# Patient Record
(demographics unavailable — no encounter records)

---

## 2024-12-11 NOTE — ASSESSMENT
[FreeTextEntry1] : Austin is a 13y male with growth hormone deficiency and a fair height velocity on growth hormone therapy. He has had increased weight gain since my last visit and now has a BMI in the overweight range.   Plan - Increase Skytrofa from 9.1 to 11 mg weekly.  - Obtain annual growth hormone blood work four days after Skytrofa injection and contact me 1-2 days later to discuss results.  - Increase physical activity.  - Decrease caloric intake.  - Follow up in 3 months.   Jeniffer Castillo MD Pediatric Endocrinology [address], [address] [place] [address] [phone]

## 2024-12-11 NOTE — ASSESSMENT
[FreeTextEntry1] : Austin is a 13y male with growth hormone deficiency and a fair height velocity on growth hormone therapy. He has had increased weight gain since my last visit and now has a BMI in the overweight range.   Plan - Increase Skytrofa from 9.1 to 11 mg weekly.  - Obtain annual growth hormone blood work four days after Skytrofa injection and contact me 1-2 days later to discuss results.  - Increase physical activity.  - Decrease caloric intake.  - Follow up in 3 months.   Jeniffer Castillo MD Pediatric Endocrinology [address], [address] [place] [address] [phone]

## 2024-12-11 NOTE — CONSULT LETTER
[Dear  ___] : Dear  [unfilled], [Courtesy Letter:] : I had the pleasure of seeing your patient, [unfilled], in my office today. [Please see my note below.] : Please see my note below. [Consult Closing:] : Thank you very much for allowing me to participate in the care of this patient.  If you have any questions, please do not hesitate to contact me. [Sincerely,] : Sincerely, [FreeTextEntry3] : Jeniffer Castillo MD Pediatric Endocrinologist

## 2024-12-11 NOTE — HISTORY OF PRESENT ILLNESS
[FreeTextEntry2] : Austin is a 13y male here for follow up of growth hormone deficiency.   Austin has been tolerating growth hormone injections well. Austin denies polyuria, polydipsia, nocturia, headaches, vision changes, peripheral swelling, and hip pains. Mother reports Austin has been outgrowing clothes and shoes (size 5). Mother reports that Austin has no missed doses of growth hormone.   Since last visit with me, Austin has grown 3.1 cm and gained 15 lbs. Height velocity 5.22 cm/yr. Mother's height 61 inches Father's height 69 inches Mid-parental sex-adjusted target height 67.5 inches.

## 2024-12-11 NOTE — PHYSICAL EXAM
[Healthy Appearing] : healthy appearing [Well Nourished] : well nourished [Interactive] : interactive [Normal Appearance] : normal appearance [Well formed] : well formed [Normally Set] : normally set [Normal S1 and S2] : normal S1 and S2 [Clear to Ausculation Bilaterally] : clear to auscultation bilaterally [Abdomen Soft] : soft [Abdomen Tenderness] : non-tender [] : no hepatosplenomegaly [2] : was Claos stage 2 [Testes] : normal [___] : [unfilled] [Normal] : normal  [Murmur] : no murmurs [Scoliosis] : scoliosis not appreciated [FreeTextEntry1] : None [de-identified] : Hips full range of motion.

## 2024-12-11 NOTE — PHYSICAL EXAM
[Healthy Appearing] : healthy appearing [Well Nourished] : well nourished [Interactive] : interactive [Normal Appearance] : normal appearance [Well formed] : well formed [Normally Set] : normally set [Normal S1 and S2] : normal S1 and S2 [Clear to Ausculation Bilaterally] : clear to auscultation bilaterally [Abdomen Soft] : soft [Abdomen Tenderness] : non-tender [] : no hepatosplenomegaly [2] : was Calos stage 2 [Testes] : normal [___] : [unfilled] [Normal] : normal  [Murmur] : no murmurs [Scoliosis] : scoliosis not appreciated [FreeTextEntry1] : None [de-identified] : Hips full range of motion.

## 2025-05-07 NOTE — ASSESSMENT
[FreeTextEntry1] : Austin is a 13y 5m male with growth hormone deficiency and a sub-optimal height velocity on growth hormone therapy. He reportedly had better height velocities with daily growth hormone therapy, and thus it may be appropriate to switch from weekly to daily growth hormone therapy. He has a BMI in the obese range likely secondary to excess caloric intake and lack of physical activity. He has an elevated insulin level.  Plan - We will wait for final blood work to result, which includes an IGF-1, and then consider switching back to the daily GH. Austin's dose of daily growth hormone would be 2.6 mg daily (0.33 mg/kg/w). - Decrease caloric intake. - Decrease carbohydrates in the meal plan.  - Increase physical activity.  - Continue to monitor growth and development.  - Follow up in 3 months.   Jeniffer Castillo MD Pediatric Endocrinology [address], [address] [place] [address] [phone]

## 2025-05-07 NOTE — DATA REVIEWED
[FreeTextEntry1] : I reviewed the partial blood work performed 5/2/2025. Blood work is significant for an elevation in the hemoglobin A1c to 5.8% and an elevated insulin level of 36.6 uU/mL. I will confirm that this was a fasting specimen. Austin has a mild microcytic anemia for which I will recommend follow up with the pediatrician. Growth factors are still pending.

## 2025-05-07 NOTE — PHYSICAL EXAM
[Healthy Appearing] : healthy appearing [Well Nourished] : well nourished [Interactive] : interactive [Normal Appearance] : normal appearance [Well formed] : well formed [Normally Set] : normally set [Normal S1 and S2] : normal S1 and S2 [Clear to Ausculation Bilaterally] : clear to auscultation bilaterally [Abdomen Soft] : soft [Abdomen Tenderness] : non-tender [] : no hepatosplenomegaly [3] : was Calos stage 3 [Scant] : scant [___] : [unfilled] [Normal] : normal  [Murmur] : no murmurs

## 2025-05-07 NOTE — HISTORY OF PRESENT ILLNESS
[FreeTextEntry2] : Shira is a 13y 5m male here for follow up of growth hormone deficiency.  hSira was initially evaluated by Dr. Pagan for monitoring of his growth. Due to poor growth, growth hormone stimulation testing was performed in October 2018 with a peak growth hormone of 7.1 ng/mL (deficient). He was started on growth hormone therapy. After Dr. Pagan's jail, Shira's care was transitioned to Dr. Gloria and then subsequently to me. Shira is on Skytrofa 11 mg weekly.  Shira has been tolerating growth hormone injections well. Shira denies polyuria, polydipsia, nocturia, headaches, vision changes, peripheral swelling, and hip pains. Shira has slowly been outgrowing clothes and shoes (size 5.5). Mother reports no missed doses of growth hormone. Shira believes that he was growing more quickly when he had been on daily growth hormone. Shira has a meal plan that was heavy in carbohydrates until recently. Blood work was not performed fasting. Shira is not very physically active.    Since last visit, SHIRA has grown 2 cm and gained 14 lbs. Height velocity 5 cm/yr. Mother's height 61 inches Father's height 69 inches Mid-parental sex-adjusted target height 67.5 inches.

## 2025-05-15 NOTE — PHYSICAL EXAM
[Normal] : normal [WNL] : within normal limits [Intermittent] : intermittent [Accelerated] : accelerated [Agitated] : agitated [Defensive] : defensive [Evasive] : evasive [Anxious] : anxious [Irritable] : irritable [Constricted] : constricted [Clear] : clear [Loud] : loud [Linear/Goal Directed] : linear/goal directed [Depressive] : depressive [None Reported] : none reported
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
## 2025-05-15 NOTE — PLAN
[Provision of National Suicide Prevention Lifeline 2-510-792-TALK (2143)] : Provision of national suicide prevention lifeline 0-978-522-talk (1162) [Patient] : patient [Family] : family [Education provided regarding environmental safety/ lethal means restriction] : Education provided regarding environmental safety/ lethal means restriction [Contact was Attempted] : contact was attempted

## 2025-06-24 NOTE — HISTORY OF PRESENT ILLNESS
[FreeTextEntry2] : Austin is a 13y 6m male here for follow up of growth hormone deficiency.   Austin has been tolerating growth hormone injections well. Austin reports some polydipsia and nocturnal enuresis. Austin denies polyuria, headaches, vision changes, peripheral swelling, and hip pains. Austin has been outgrowing clothes and shoes (size 6). Austin reports two missed doses of weekly growth hormone since last visit. Mother reports that the Norditropin was delivered today and that they will be starting the daily growth hormone this week. Austin and his family are moving to [place] tomorrow.

## 2025-06-24 NOTE — ASSESSMENT
[FreeTextEntry1] : Austin is a 13y 6m male with growth hormone deficiency and history of a sub-optimal height velocity on weekly growth hormone therapy. He will be switching back to daily growth hormone this week. Austin has possible polydipsia and nocturia and he has a BMI in the obese range likely secondary to excess caloric intake and lack of physical activity. He has history of an elevated insulin level.  Plan - Restart Norditropin 2.6 mg daily (0.33 mg/kg/w). - Decrease caloric intake. - Decrease carbohydrates in the meal plan.  - Increase physical activity.  - Continue to monitor growth and development with endocrinology in [place]. Mother has signed authorization release form for records to be sent to new endocrinologist.   Jeniffer Castillo MD Pediatric Endocrinology [address], [address] [place] [address] [phone]